# Patient Record
Sex: MALE
[De-identification: names, ages, dates, MRNs, and addresses within clinical notes are randomized per-mention and may not be internally consistent; named-entity substitution may affect disease eponyms.]

---

## 2018-11-07 ENCOUNTER — HOSPITAL ENCOUNTER (EMERGENCY)
Dept: HOSPITAL 97 - ER | Age: 23
Discharge: HOME | End: 2018-11-07
Payer: COMMERCIAL

## 2018-11-07 DIAGNOSIS — Z88.8: ICD-10-CM

## 2018-11-07 DIAGNOSIS — F17.210: ICD-10-CM

## 2018-11-07 DIAGNOSIS — L60.0: Primary | ICD-10-CM

## 2018-11-07 PROCEDURE — 99283 EMERGENCY DEPT VISIT LOW MDM: CPT

## 2018-11-07 NOTE — ER
Nurse's Notes                                                                                     

 [facility]                                                                

Name: Gilbert Lemos                                                                             

Age: 23 yrs                                                                                       

Sex: Male                                                                                         

: 1995                                                                                   

MRN: [id number]                                                                                   

Arrival Date: 2018                                                                          

Time: 10:30                                                                                       

Account#: [id number]                                                                            

Bed 25                                                                                            

Private MD: None, None                                                                            

Diagnosis: Ingrowing nail                                                                         

                                                                                                  

Presentation:                                                                                     

                                                                                             

10:55 Presenting complaint: Patient states: "I don't know if its an ingrown nail on my foot,  aj1 

      but its swollen on the side and it hurts to walk and I'm on my feet all day at work"        

      Patient reports pain to left great toe. Patient ambulated to triage with a steady gait.     

      Transition of care: patient was not received from another setting of care. Onset of         

      symptoms was 2018. Risk Assessment: Do you want to hurt yourself or            

      someone else? Patient reports no desire to harm self or others. Initial Sepsis Screen:      

      Does the patient meet any 2 criteria? No. Patient's initial sepsis screen is negative.      

      Does the patient have a suspected source of infection? No. Patient's initial sepsis         

      screen is negative. Care prior to arrival: None.                                            

10:55 Method Of Arrival: Ambulatory                                                           aj1 

10:55 Acuity: JARED 4                                                                           aj1 

                                                                                                  

Triage Assessment:                                                                                

10:57 General: Appears in no apparent distress. comfortable, Behavior is calm, cooperative,   aj1 

      appropriate for age. Pain: Complains of pain in left first toe and Left first toenail       

      Pain currently is 5 out of 10 on a pain scale. at worst was 10 out of 10 on a pain          

      scale. Neuro: Level of Consciousness is awake, alert, obeys commands. Cardiovascular:       

      Patient's skin is warm and dry. Respiratory: Airway is patent Respiratory effort is         

      even, unlabored, Respiratory pattern is regular, symmetrical.                               

                                                                                                  

Historical:                                                                                       

- Allergies:                                                                                      

10:57 Triaminic Allergy;                                                                      aj1 

- Home Meds:                                                                                      

10:57 None [Active];                                                                          aj1 

- PMHx:                                                                                           

10:57 None;                                                                                   aj1 

                                                                                                  

- Immunization history:: Flu vaccine is not up to date.                                           

- Social history:: Smoking status: Patient uses tobacco products, smokes one-half pack            

  cigarettes per day.                                                                             

- Ebola Screening: : Patient denies travel to an Ebola-affected area in the 21 days               

  before illness onset.                                                                           

                                                                                                  

                                                                                                  

Screenin:29 Abuse screen: Denies threats or abuse. Nutritional screening: No deficits noted.        tw2 

      Tuberculosis screening: No symptoms or risk factors identified. Fall Risk None              

      identified.                                                                                 

                                                                                                  

Assessment:                                                                                       

11:26 General: Appears in no apparent distress. obese, Behavior is calm, cooperative,         tw2 

      appropriate for age. Pain: Complains of pain in Left first toenail. Neuro: Level of         

      Consciousness is awake, alert, obeys commands, Oriented to person, place, time,             

      situation. Cardiovascular: Capillary refill < 3 seconds Patient's skin is warm and dry.     

      Respiratory: Airway is patent Respiratory effort is even, unlabored, Respiratory            

      pattern is regular, symmetrical. GI: No signs and/or symptoms were reported involving       

      the gastrointestinal system. : No signs and/or symptoms were reported regarding the       

      genitourinary system. EENT: No signs and/or symptoms were reported regarding the EENT       

      system. Derm: appears to be ingrown toenail on the left lateral border of the great         

      toe, redness noted, nail is short and cut back short on this border. Musculoskeletal:       

      Capillary refill < 3 seconds, Range of motion: intact in all extremities.                   

12:07 Reassessment: Patient appears in no apparent distress at this time. No changes from     tw2 

      previously documented assessment. Patient and/or family updated on plan of care and         

      expected duration. Pain level reassessed. Patient is alert, oriented x 3, equal             

      unlabored respirations, skin warm/dry/pink.                                                 

                                                                                                  

Vital Signs:                                                                                      

10:57  / 74; Pulse 88; Resp 18; Temp 97.0; Pulse Ox 98% on R/A; Weight 97.07 kg (R);    aj1 

      Height 5 ft. 3 in. (160.02 cm) (R); Pain 5/10;                                              

10:57 Body Mass Index 37.91 (97.07 kg, 160.02 cm)                                             aj1 

                                                                                                  

ED Course:                                                                                        

10:30 Patient arrived in ED.                                                                  sb2 

10:31 None, None is Private Physician.                                                        sb2 

10:56 Triage completed.                                                                       aj1 

10:57 Arm band placed on Patient placed in waiting room, Patient notified of wait time.       aj1 

11:24 Love Oakley, RN is Primary Nurse.                                                        tw2 

11:27 Manuel Randall PA is PHCP.                                                               jr8 

11:27 Mateo Bowen MD is Attending Physician.                                                jr8 

11:29 Bed in low position. Call light in reach. Pulse ox on. NIBP on.                         tw2 

12:02 Maximo Abreu DPM is Referral Physician.                                               jr8 

12: No provider procedures requiring assistance completed. Patient did not have IV access   tw2 

      during this emergency room visit.                                                           

                                                                                                  

Administered Medications:                                                                         

No medications were administered                                                                  

                                                                                                  

                                                                                                  

Outcome:                                                                                          

12:02 Discharge ordered by MD.                                                                jr8 

12:07 Discharged to home ambulatory.                                                          tw2 

12:07 Condition: stable                                                                           

12:07 Discharge instructions given to patient, Instructed on discharge instructions, follow       

      up and referral plans. no drinking with medication, no driving heavy equipment,             

      medication usage, Demonstrated understanding of instructions, follow-up care,               

      medications, Prescriptions given X 3.                                                       

12:08 Patient left the ED.                                                                    tw2 

                                                                                                  

Signatures:                                                                                       

Mikaela Bran RN                     RN   aj1                                                  

Manuel Randall PA PA   jr8                                                  

Love Oakley RN                          RN   tw2                                                  

Anita Mcknight                               sb2                                                  

                                                                                                  

**************************************************************************************************

## 2018-11-07 NOTE — EDPHYS
Physician Documentation                                                                           

 [facility]                                                                

Name: Gilbert Lemos                                                                             

Age: 23 yrs                                                                                       

Sex: Male                                                                                         

: 1995                                                                                   

MRN: [id number]                                                                                   

Arrival Date: 2018                                                                          

Time: 10:30                                                                                       

Account#: [id number]                                                                            

Bed 25                                                                                            

Private MD: None, None                                                                            

ED Physician Mateo Bowen                                                                         

HPI:                                                                                              

                                                                                             

11:54 This 23 yrs old  Male presents to ER via Ambulatory with complaints of Ingrown  jr8 

      toenail.                                                                                    

11:54 The complaints affect the left foot. Onset: The symptoms/episode began/occurred         jr8 

      gradually, 2 day(s) ago. Modifying factors: The symptoms are alleviated by nothing, the     

      symptoms are aggravated by weight bearing, movement. Associated signs and symptoms: The     

      patient has no apparent associated signs or symptoms. Severity of symptoms: At their        

      worst the symptoms were mild, in the emergency department the symptoms are unchanged.       

      The patient has not experienced similar symptoms in the past. The patient has not           

      recently seen a physician. Pain to left great toe.                                          

                                                                                                  

Historical:                                                                                       

- Allergies:                                                                                      

10:57 Triaminic Allergy;                                                                      aj1 

- Home Meds:                                                                                      

10:57 None [Active];                                                                          aj1 

- PMHx:                                                                                           

10:57 None;                                                                                   aj1 

                                                                                                  

- Immunization history:: Flu vaccine is not up to date.                                           

- Social history:: Smoking status: Patient uses tobacco products, smokes one-half pack            

  cigarettes per day.                                                                             

- Ebola Screening: : Patient denies travel to an Ebola-affected area in the 21 days               

  before illness onset.                                                                           

                                                                                                  

                                                                                                  

ROS:                                                                                              

11:54 Constitutional: Negative for fever, chills, and weight loss.                            jr8 

11:54 MS/extremity: Positive for pain, swelling, tenderness, of the left first toe.               

11:54 All other systems are negative.                                                             

                                                                                                  

Exam:                                                                                             

11:54 Constitutional:  This is a well developed, well nourished patient who is awake, alert,  jr8 

      and in no acute distress. Cardiovascular:  Regular rate and rhythm with a normal S1 and     

      S2.  No gallops, murmurs, or rubs.  Normal PMI, no JVD.  No pulse deficits.                 

      Respiratory:  Lungs have equal breath sounds bilaterally, clear to auscultation and         

      percussion.  No rales, rhonchi or wheezes noted.  No increased work of breathing, no        

      retractions or nasal flaring. Skin:  Warm, dry with normal turgor.  Normal color with       

      no rashes, no lesions, and no evidence of cellulitis. Neuro:  Awake and alert, GCS 15,      

      oriented to person, place, time, and situation.  Cranial nerves II-XII grossly intact.      

      Motor strength 5/5 in all extremities.  Sensory grossly intact.  Cerebellar exam            

      normal.  Normal gait.                                                                       

11:54 Musculoskeletal/extremity: ROM: intact in all extremities, Circulation is intact in all     

      extremities. Sensation intact. Mild redness and swelling along with tenderness to           

      lateral cuticule region of left great toe. No exudate noted upon expressing region. .       

                                                                                                  

Vital Signs:                                                                                      

10:57  / 74; Pulse 88; Resp 18; Temp 97.0; Pulse Ox 98% on R/A; Weight 97.07 kg (R);    aj1 

      Height 5 ft. 3 in. (160.02 cm) (R); Pain 5/10;                                              

10:57 Body Mass Index 37.91 (97.07 kg, 160.02 cm)                                             aj1 

                                                                                                  

MDM:                                                                                              

11:27 Patient medically screened.                                                             [facility] 

11:54 Data reviewed: vital signs, nurses notes, and as a result, I will discharge patient.    [facility] 

      Data interpreted: Pulse oximetry: on room air is 98 %. Interpretation: normal.              

      Counseling: I had a detailed discussion with the patient and/or guardian regarding: the     

      historical points, exam findings, and any diagnostic results supporting the                 

      discharge/admit diagnosis, the need for outpatient follow up, a podiatrist, to return       

      to the emergency department if symptoms worsen or persist or if there are any questions     

      or concerns that arise at home.                                                             

                                                                                                  

Administered Medications:                                                                         

No medications were administered                                                                  

                                                                                                  

                                                                                                  

Disposition:                                                                                      

14:08 Co-signature as Attending Physician, Mateo Bowen MD.                                    rn  

                                                                                                  

Disposition:                                                                                      

18 12:02 Discharged to Home. Impression: Ingrowing nail.                                    

- Condition is Stable.                                                                            

- Discharge Instructions: Ingrown Toenail.                                                        

- Prescriptions for Ibuprofen 800 mg Oral Tablet - take 1 tablet by ORAL route every 12           

  hours As needed take with food; 20 tablet. Tylenol- Codeine #3 300-30 mg Oral Tablet            

  - take 2 tablets by ORAL route every 6 hours As needed; 12 tablet. Keflex 500 mg Oral           

  Capsule - take 1 capsule by ORAL route every 8 hours for 7 days; 21 capsule.                    

- Medication Reconciliation Form, Thank You Letter, Antibiotic Education, Prescription            

  Opioid Use, Work release form form.                                                             

- Follow up: Maximo Abreu DPM; When: 5 - 6 days; Reason: Recheck today's complaints,            

  Continuance of care, Re-evaluation by your physician.                                           

- Problem is new.                                                                                 

- Symptoms have improved.                                                                         

                                                                                                  

                                                                                                  

                                                                                                  

Signatures:                                                                                       

Mikaela Bran RN                     RN   aj1                                                  

Mateo Bowen MD MD rn Roszak, Josh, PA PA   jr8                                                  

Love Oakley RN                          RN   tw2                                                  

                                                                                                  

Corrections: (The following items were deleted from the chart)                                    

12:02 11:54 Cardiovascular: Regular rate and rhythm with a normal S1 and S2. No gallops,      jr8 

      murmurs, or rubs. Normal PMI, no JVD. No pulse deficits. Respiratory: Lungs have equal      

      breath sounds bilaterally, clear to auscultation and percussion. No rales, rhonchi or       

      wheezes noted. No increased work of breathing, no retractions or nasal flaring. Skin:       

      Warm, dry with normal turgor. Normal color with no rashes, no lesions, and no evidence      

      of cellulitis. Neuro: Awake and alert, GCS 15, oriented to person, place, time, and         

      situation. Cranial nerves II-XII grossly intact. Motor strength 5/5 in all extremities.     

      Sensory grossly intact. Cerebellar exam normal. Normal gait. jr8                            

12:08 12:02 2018 12:02 Discharged to Home. Impression: Ingrowing nail. Condition is     tw2 

      Stable. Forms are Work release form, Medication Reconciliation Form, Thank You Letter,      

      Antibiotic Education, Prescription Opioid Use. Follow up: Maximo Abreu; When: 5 - 6         

      days; Reason: Recheck today's complaints, Continuance of care, Re-evaluation by your        

      physician. Problem is new. Symptoms have improved. jr8                                      

                                                                                                  

**************************************************************************************************